# Patient Record
Sex: MALE | Race: WHITE | NOT HISPANIC OR LATINO | Employment: OTHER | RURAL
[De-identification: names, ages, dates, MRNs, and addresses within clinical notes are randomized per-mention and may not be internally consistent; named-entity substitution may affect disease eponyms.]

---

## 2017-06-02 ENCOUNTER — CONVERSION ENCOUNTER (OUTPATIENT)
Dept: FAMILY MEDICINE CLINIC | Facility: CLINIC | Age: 62
End: 2017-06-02

## 2017-06-02 LAB
ALBUMIN SERPL-MCNC: 4.5 G/DL (ref 3.6–5.1)
ALBUMIN/GLOB SERPL: ABNORMAL {RATIO} (ref 1–2.5)
ALP SERPL-CCNC: 103 UNITS/L (ref 40–115)
ALT SERPL-CCNC: 18 UNITS/L (ref 9–46)
AST SERPL-CCNC: 18 UNITS/L (ref 10–35)
BASOPHILS # BLD AUTO: ABNORMAL 10*3/MM3 (ref 0–200)
BASOPHILS NFR BLD AUTO: 0.8 %
BILIRUB SERPL-MCNC: 0.4 MG/DL (ref 0.2–1.2)
BUN SERPL-MCNC: 21 MG/DL (ref 7–25)
BUN/CREAT SERPL: ABNORMAL (ref 6–22)
CALCIUM SERPL-MCNC: 9.7 MG/DL (ref 8.6–10.3)
CHLORIDE SERPL-SCNC: 106 MMOL/L (ref 98–110)
CO2 CONTENT VENOUS: 28 MMOL/L (ref 20–31)
CONV NEUTROPHILS/100 LEUKOCYTES IN BODY FLUID BY MANUAL COUNT: 56.2 %
CONV TOTAL PROTEIN: 7.3 G/DL (ref 6.1–8.1)
CREAT UR-MCNC: 1.06 MG/DL (ref 0.7–1.25)
EOSINOPHIL # BLD AUTO: 6.5 %
EOSINOPHIL # BLD AUTO: ABNORMAL 10*3/MM3 (ref 15–500)
ERYTHROCYTE [DISTWIDTH] IN BLOOD BY AUTOMATED COUNT: 13.4 % (ref 11–15)
GLOBULIN UR ELPH-MCNC: ABNORMAL G/DL (ref 1.9–3.7)
GLUCOSE SERPL-MCNC: 102 MG/DL (ref 65–99)
HCT VFR BLD AUTO: 46 % (ref 38.5–50)
HGB BLD-MCNC: 15.5 G/DL (ref 13.2–17.1)
LYMPHOCYTES # BLD AUTO: ABNORMAL 10*3/MM3 (ref 850–3900)
LYMPHOCYTES NFR BLD AUTO: 26.5 %
MCH RBC QN AUTO: 28.9 PG (ref 27–33)
MCHC RBC AUTO-ENTMCNC: ABNORMAL % (ref 32–36)
MCV RBC AUTO: 85.7 FL (ref 80–100)
MONOCYTES # BLD AUTO: ABNORMAL 10*3/MICROLITER (ref 200–950)
MONOCYTES NFR BLD AUTO: 10 %
NEUTROPHILS # BLD AUTO: ABNORMAL 10*3/MM3 (ref 1500–7800)
PLATELET # BLD AUTO: ABNORMAL 10*3/MM3 (ref 140–400)
PMV BLD AUTO: 9.9 FL (ref 7.5–12.5)
POTASSIUM SERPL-SCNC: 4.5 MMOL/L (ref 3.5–5.3)
RBC # BLD AUTO: ABNORMAL 10*6/MM3 (ref 4.2–5.8)
SODIUM SERPL-SCNC: 139 MMOL/L (ref 135–146)
WBC # BLD AUTO: ABNORMAL K/UL (ref 3.8–10.8)

## 2018-01-04 ENCOUNTER — HOSPITAL ENCOUNTER (OUTPATIENT)
Dept: OTHER | Facility: HOSPITAL | Age: 63
Discharge: HOME | End: 2018-01-04
Attending: FAMILY MEDICINE | Admitting: FAMILY MEDICINE

## 2018-09-26 ENCOUNTER — HOSPITAL ENCOUNTER (OUTPATIENT)
Dept: OTHER | Facility: HOSPITAL | Age: 63
Discharge: HOME | End: 2018-09-26
Attending: FAMILY MEDICINE | Admitting: FAMILY MEDICINE

## 2023-01-13 ENCOUNTER — OFFICE VISIT (OUTPATIENT)
Dept: PAIN MEDICINE | Facility: CLINIC | Age: 68
End: 2023-01-13
Payer: MEDICARE

## 2023-01-13 VITALS
DIASTOLIC BLOOD PRESSURE: 84 MMHG | SYSTOLIC BLOOD PRESSURE: 154 MMHG | OXYGEN SATURATION: 97 % | HEART RATE: 62 BPM | RESPIRATION RATE: 16 BRPM

## 2023-01-13 DIAGNOSIS — M51.9 FORAMINAL STENOSIS DUE TO INTERVERTEBRAL DISC DISEASE: ICD-10-CM

## 2023-01-13 DIAGNOSIS — M99.79 FORAMINAL STENOSIS DUE TO INTERVERTEBRAL DISC DISEASE: ICD-10-CM

## 2023-01-13 DIAGNOSIS — M54.16 LUMBAR RADICULOPATHY: Primary | ICD-10-CM

## 2023-01-13 PROCEDURE — 99204 OFFICE O/P NEW MOD 45 MIN: CPT | Performed by: STUDENT IN AN ORGANIZED HEALTH CARE EDUCATION/TRAINING PROGRAM

## 2023-01-13 PROCEDURE — G0463 HOSPITAL OUTPT CLINIC VISIT: HCPCS | Performed by: STUDENT IN AN ORGANIZED HEALTH CARE EDUCATION/TRAINING PROGRAM

## 2023-01-13 RX ORDER — INDAPAMIDE 1.25 MG/1
1.25 TABLET, FILM COATED ORAL EVERY MORNING
COMMUNITY
Start: 2022-10-15

## 2023-01-13 RX ORDER — GABAPENTIN 300 MG/1
300 CAPSULE ORAL 3 TIMES DAILY
Qty: 90 CAPSULE | Refills: 0 | Status: SHIPPED | OUTPATIENT
Start: 2023-01-13 | End: 2023-02-09 | Stop reason: SDDI

## 2023-01-13 RX ORDER — OLMESARTAN MEDOXOMIL 40 MG/1
40 TABLET ORAL DAILY
COMMUNITY
Start: 2022-10-13

## 2023-01-13 RX ORDER — AMLODIPINE BESYLATE 5 MG/1
5 TABLET ORAL DAILY
COMMUNITY
Start: 2022-10-18

## 2023-01-13 RX ORDER — DICLOFENAC SODIUM 75 MG/1
TABLET, DELAYED RELEASE ORAL
COMMUNITY
Start: 2022-10-13

## 2023-01-13 NOTE — PROGRESS NOTES
CHIEF COMPLAINT  Chief Complaint   Patient presents with   • Hip Pain     New patient referred for right hip and right leg pain No Narcotics        Primary Care  Jarad Cancino MD    Subjective   Jeremy Gill is a 67 y.o. male  who presents for right lower extremity radiculopathy.  He states that his pain initially began several months ago.  He initially thought that he was having primary hip pain and was referred to the orthopedic surgeon.  His orthopedic surgery evaluation showed that while he did have very significant bilateral hip osteoarthritis, it also showed that he had fairly significant foraminal stenosis especially on the right.  His orthopedic surgeon felt that his pain was more likely a result of foraminal stenosis as opposed to actual hip pathology.  He describes pain in the low back which radiates into the right buttock and down the anterior lateral aspect of the right leg into the right foot.  He states that the pain is worse with movement and physical activity and improves with sitting and resting.  He denies any red flag symptoms such as loss of bowel or bladder.  He admits to some mild weakness, but no profound weakness or numbness.    History of Present Illness     Location: Low back with radiation the right lower extremity  Onset: 6 months ago  Duration: Progressively worsening  Timing: Constant throughout the day  Quality: Aching, sharp, stabbing  Severity: Today: 8       Last Week: 8       Worst: 9  Modifying Factors: The pain is worse with walking and movement and physical activity and improves with rest  Functional Deficit: The pain makes it difficult for him to work and perform his activities of daily living    Physical Therapy: no    Interval Update 01/13/2023:     The following portions of the patient's history were reviewed and updated as appropriate: allergies, current medications, past family history, past medical history, past social history, past surgical history and problem  list.      Current Outpatient Medications:   •  amLODIPine (NORVASC) 5 MG tablet, Take 5 mg by mouth Daily., Disp: , Rfl:   •  diclofenac (VOLTAREN) 75 MG EC tablet, TAKE 1 TABLET BY MOUTH TWICE A DAY AS NEEDED FOR PAIN WITH FOOD, Disp: , Rfl:   •  indapamide (LOZOL) 1.25 MG tablet, Take 1.25 mg by mouth Every Morning., Disp: , Rfl:   •  olmesartan (BENICAR) 40 MG tablet, Take 40 mg by mouth Daily., Disp: , Rfl:   •  gabapentin (NEURONTIN) 300 MG capsule, Take 1 capsule by mouth 3 (Three) Times a Day., Disp: 90 capsule, Rfl: 0    Review of Systems   Musculoskeletal: Positive for arthralgias, back pain and gait problem. Negative for joint swelling and myalgias.   Neurological: Positive for weakness. Negative for numbness.       Vitals:    01/13/23 1310   BP: 154/84   Pulse: 62   Resp: 16   SpO2: 97%   PainSc:   8         Objective   Physical Exam  Vitals and nursing note reviewed. Exam conducted with a chaperone present.   Constitutional:       General: He is not in acute distress.     Appearance: Normal appearance. He is well-developed and normal weight.   Neck:      Trachea: No tracheal deviation.   Musculoskeletal:      Comments: Lumbar Spine Exam:  Tender to palpation over the lumbar paraspinal musculature Yes  Limited range of motion secondary to pain Yes  Facet loading positive: Negative  Facets tender to palpation: Negative  Straight leg raise test positive: right       Neurological:      General: No focal deficit present.      Mental Status: He is alert.      Sensory: No sensory deficit.      Motor: Weakness present.           Assessment & Plan   Problems Addressed this Visit    None  Visit Diagnoses     Lumbar radiculopathy    -  Primary    Relevant Orders    Epidural Block    Foraminal stenosis due to intervertebral disc disease        Relevant Orders    Epidural Block      Diagnoses       Codes Comments    Lumbar radiculopathy    -  Primary ICD-10-CM: M54.16  ICD-9-CM: 724.4     Foraminal stenosis due to  intervertebral disc disease     ICD-10-CM: M99.79, M51.9  ICD-9-CM: 724.00, 722.90           Plan:  1. Given his foraminal stenosis seen on MRI, will plan for right-sided L2 and L3 TFESI consistent with his radicular symptoms  2. Also start gabapentin 300 mg 3 times daily  --- Follow-up next available for right-sided L2 and L3 TFESI           INSPECT REPORT    As part of the patient's treatment plan, I may be prescribing controlled substances. The patient has been made aware of appropriate use of such medications, including potential risk of somnolence, limited ability to drive and/or work safely, and the potential for dependence or overdose. It has also bee made clear that these medications are for use by this patient only, without concomitant use of alcohol or other substances unless prescribed.     Patient has completed prescribing agreement detailing terms of continued prescribing of controlled substances, including monitoring ALVIN reports, urine drug screening, and pill counts if necessary. The patient is aware that inappropriate use will results in cessation of prescribing such medications.    INSPECT report has been reviewed and scanned into the patient's chart.    As the clinician, I personally reviewed the INSPECT from 1/12/2023.    History and physical exam exhibit continued safe and appropriate use of controlled substances.      EMR Dragon/Transcription disclaimer:   Much of this encounter note is an electronic transcription/translation of spoken language to printed text. The electronic translation of spoken language may permit erroneous, or at times, nonsensical words or phrases to be inadvertently transcribed; Although I have reviewed the note for such errors, some may still exist.

## 2023-01-19 ENCOUNTER — HOSPITAL ENCOUNTER (OUTPATIENT)
Dept: GENERAL RADIOLOGY | Facility: HOSPITAL | Age: 68
Discharge: HOME | End: 2023-01-19
Payer: MEDICARE

## 2023-01-19 ENCOUNTER — HOSPITAL ENCOUNTER (OUTPATIENT)
Dept: PAIN MEDICINE | Facility: HOSPITAL | Age: 68
Discharge: HOME | End: 2023-01-19
Payer: MEDICARE

## 2023-01-19 VITALS
TEMPERATURE: 97.1 F | HEART RATE: 60 BPM | DIASTOLIC BLOOD PRESSURE: 90 MMHG | SYSTOLIC BLOOD PRESSURE: 150 MMHG | OXYGEN SATURATION: 97 % | RESPIRATION RATE: 16 BRPM

## 2023-01-19 DIAGNOSIS — M99.79 FORAMINAL STENOSIS DUE TO INTERVERTEBRAL DISC DISEASE: ICD-10-CM

## 2023-01-19 DIAGNOSIS — M54.16 LUMBAR RADICULOPATHY: Primary | ICD-10-CM

## 2023-01-19 DIAGNOSIS — M51.9 FORAMINAL STENOSIS DUE TO INTERVERTEBRAL DISC DISEASE: ICD-10-CM

## 2023-01-19 DIAGNOSIS — R52 PAIN: ICD-10-CM

## 2023-01-19 PROCEDURE — 64483 NJX AA&/STRD TFRM EPI L/S 1: CPT | Performed by: STUDENT IN AN ORGANIZED HEALTH CARE EDUCATION/TRAINING PROGRAM

## 2023-01-19 PROCEDURE — 25010000002 DEXAMETHASONE SODIUM PHOSPHATE 10 MG/ML SOLUTION: Performed by: STUDENT IN AN ORGANIZED HEALTH CARE EDUCATION/TRAINING PROGRAM

## 2023-01-19 PROCEDURE — 64484 NJX AA&/STRD TFRM EPI L/S EA: CPT | Performed by: STUDENT IN AN ORGANIZED HEALTH CARE EDUCATION/TRAINING PROGRAM

## 2023-01-19 PROCEDURE — 0 IOPAMIDOL 41 % SOLUTION: Performed by: STUDENT IN AN ORGANIZED HEALTH CARE EDUCATION/TRAINING PROGRAM

## 2023-01-19 PROCEDURE — 77003 FLUOROGUIDE FOR SPINE INJECT: CPT

## 2023-01-19 RX ORDER — DEXAMETHASONE SODIUM PHOSPHATE 10 MG/ML
10 INJECTION, SOLUTION INTRAMUSCULAR; INTRAVENOUS ONCE
Status: COMPLETED | OUTPATIENT
Start: 2023-01-19 | End: 2023-01-19

## 2023-01-19 RX ORDER — BUPIVACAINE HYDROCHLORIDE 2.5 MG/ML
10 INJECTION, SOLUTION EPIDURAL; INFILTRATION; INTRACAUDAL ONCE
Status: COMPLETED | OUTPATIENT
Start: 2023-01-19 | End: 2023-01-19

## 2023-01-19 RX ADMIN — BUPIVACAINE HYDROCHLORIDE 10 ML: 2.5 INJECTION, SOLUTION EPIDURAL; INFILTRATION; INTRACAUDAL; PERINEURAL at 13:47

## 2023-01-19 RX ADMIN — DEXAMETHASONE SODIUM PHOSPHATE 10 MG: 10 INJECTION, SOLUTION INTRAMUSCULAR; INTRAVENOUS at 13:48

## 2023-01-19 RX ADMIN — IOPAMIDOL 3 ML: 408 INJECTION, SOLUTION INTRATHECAL at 13:48

## 2023-01-19 NOTE — PROCEDURES
Lumbar Transforaminal Epidural Steroid Injection (two levels)  [facility]      PREOPERATIVE DIAGNOSIS:  Lumbar Degenerative Disc Disease, Lumbar Disc Displacement and Lumbar Radiculopathy    POSTOPERATIVE DIAGNOSIS:  Same as preop diagnosis    PROCEDURE:    1. CPT 37091 --  Diagnostic & Therapeutic Transforaminal Epidural Steroid Injection at the L2 level, on the right   2. CPT 14305 --  Diagnostic & Therapeutic Transforaminal Epidural Steroid Injection at the L3 level, on the right.    PRE-PROCEDURE DISCUSSION WITH PATIENT:    Risks and complications were discussed with the patient prior to starting the procedure and informed consent was obtained.  We discussed various topics including but not limited to bleeding, infection, injury, nerve injury, paralysis, coma, death, postprocedural painful flare-up, postprocedural site soreness, and a lack of pain relief.  We discussed the diagnostic aspect of transforaminal epidural / selective nerve root blockade.    SURGEON:  Shaun Abdul MD    REASON FOR PROCEDURE:    Diagnostic injection at this level is needed, Degenerative changes are noted in the area. and Radiating pattern of pain is likely consistent with degenerative changes in the area.    SEDATION:  Patient declined administration of moderate sedation    ANESTHETIC:  Marcaine 0.25%  STEROID:  Dexamethasone 10mg    DESCRIPTON OF PROCEDURE:  After obtaining informed consent, an I.V. was not started in the preoperative area. The patient taken to the operating room and was placed in the prone position with a pillow under the abdomen.  All pressure points were well padded.  The lumbar area was prepped with Chloraprep and draped in a sterile fashion. Under fluoroscopic guidance in an oblique dimension on the above mentioned side, the transverse process of the first aforementioned vertebra at the junction of the body at 6 o'clock position was identified. Skin and subcutaneous tissue was anesthetized with 1%  lidocaine. A 22-gauge spinal needle was introduced under fluoroscopic guidance at the above junction into the foramen without parasthesias and into the epidural space. After confirming the position of the needle with PA, lateral, and oblique fluoroscopic views, aspiration was checked and was clear of blood or CSF.  Next, 1 mL of Omnipaque was injected. After seeing adequate spread on the corresponding nerve root, a total volume 2mL of injectate containing local anesthetic as above and half of the above mentioned corticosteroid was injected into the epidural space.  The needle was removed intact.      Next, in similar fashion, the second level mentioned above was addressed and a similar amount of injectate was delivered after similar imaging was achieved.      Vital signs were stable throughout.          ESTIMATED BLOOD LOSS:  <5 mL  SPECIMENS:  none    COMPLICATIONS:   No complications were noted., There was no indication of vascular uptake on live injection of contrast dye., There was no indication of intrathecal uptake on live injection of contrast dye. and There was not any evidence of dural puncture.      TOLERANCE & DISCHARGE CONDITION:    The patient tolerated the procedure well.  The patient was transported to the recovery area without difficulties.  The patient was discharged to home under the care of family in stable and satisfactory condition.    PLAN OF CARE:  1. The patient was given our standard instruction sheet.  2. The patient will Return to clinic 6 wks.  3. The patient will resume all medications as per the medication reconciliation sheet.

## 2023-01-24 ENCOUNTER — TELEPHONE (OUTPATIENT)
Dept: PAIN MEDICINE | Facility: CLINIC | Age: 68
End: 2023-01-24
Payer: MEDICARE

## 2023-01-24 NOTE — TELEPHONE ENCOUNTER
Caller: TORI SIMS    Relationship: WIFE    Best call back number:     Who are you requesting to speak with (clinical staff, provider,  specific staff member): DR MCKEON    What was the call regarding: MS SIMS STATED THE EPIDURAL THE PATIENT HAD ON 1/19/23 IS NOT HELPING HIS PAIN ANYMORE. THE PATIENT STATED THE EPIDURAL HELPED HIS PAIN FOR 2 DAYS AT THE MOST.     Do you require a callback: YES

## 2023-01-27 ENCOUNTER — OFFICE VISIT (OUTPATIENT)
Dept: PAIN MEDICINE | Facility: CLINIC | Age: 68
End: 2023-01-27
Payer: MEDICARE

## 2023-01-27 VITALS
DIASTOLIC BLOOD PRESSURE: 84 MMHG | OXYGEN SATURATION: 97 % | HEART RATE: 74 BPM | SYSTOLIC BLOOD PRESSURE: 145 MMHG | RESPIRATION RATE: 16 BRPM

## 2023-01-27 DIAGNOSIS — M51.9 FORAMINAL STENOSIS DUE TO INTERVERTEBRAL DISC DISEASE: ICD-10-CM

## 2023-01-27 DIAGNOSIS — Z79.899 HIGH RISK MEDICATION USE: Primary | ICD-10-CM

## 2023-01-27 DIAGNOSIS — M99.79 FORAMINAL STENOSIS DUE TO INTERVERTEBRAL DISC DISEASE: ICD-10-CM

## 2023-01-27 PROCEDURE — 99214 OFFICE O/P EST MOD 30 MIN: CPT | Performed by: STUDENT IN AN ORGANIZED HEALTH CARE EDUCATION/TRAINING PROGRAM

## 2023-01-27 PROCEDURE — G0463 HOSPITAL OUTPT CLINIC VISIT: HCPCS | Performed by: STUDENT IN AN ORGANIZED HEALTH CARE EDUCATION/TRAINING PROGRAM

## 2023-01-27 RX ORDER — HYDROCODONE BITARTRATE AND ACETAMINOPHEN 10; 325 MG/1; MG/1
1 TABLET ORAL EVERY 8 HOURS PRN
Qty: 90 TABLET | Refills: 0 | Status: SHIPPED | OUTPATIENT
Start: 2023-01-27

## 2023-01-27 NOTE — PROGRESS NOTES
CHIEF COMPLAINT  Chief Complaint   Patient presents with   • Back Pain     F/u from Lumbar epidural block  CC  right lower extremity radiculopathy No Narcotics        Primary Care  Jarad Cancino MD    Subjective   Jeremy Gill is a 67 y.o. male  who presents for right lower extremity radiculopathy.  He states that his pain initially began several months ago.  He initially thought that he was having primary hip pain and was referred to the orthopedic surgeon.  His orthopedic surgery evaluation showed that while he did have very significant bilateral hip osteoarthritis, it also showed that he had fairly significant foraminal stenosis especially on the right.  His orthopedic surgeon felt that his pain was more likely a result of foraminal stenosis as opposed to actual hip pathology.  He describes pain in the low back which radiates into the right buttock and down the anterior lateral aspect of the right leg into the right foot.  He states that the pain is worse with movement and physical activity and improves with sitting and resting.  He denies any red flag symptoms such as loss of bowel or bladder.  He admits to some mild weakness, but no profound weakness or numbness.    Back Pain  Associated symptoms include weakness. Pertinent negatives include no numbness.        Location: Low back with radiation the right lower extremity  Onset: 6 months ago  Duration: Progressively worsening  Timing: Constant throughout the day  Quality: Aching, sharp, stabbing  Severity: Today: 8       Last Week: 8       Worst: 9  Modifying Factors: The pain is worse with walking and movement and physical activity and improves with rest  Functional Deficit: The pain makes it difficult for him to work and perform his activities of daily living    Physical Therapy: no    Interval Update 01/27/2023: He reports approximately 48 hours of 100% pain relief following his transforaminal with a return of symptoms as before.  Limited benefit  with gabapentin.    The following portions of the patient's history were reviewed and updated as appropriate: allergies, current medications, past family history, past medical history, past social history, past surgical history and problem list.      Current Outpatient Medications:   •  amLODIPine (NORVASC) 5 MG tablet, Take 5 mg by mouth Daily., Disp: , Rfl:   •  diclofenac (VOLTAREN) 75 MG EC tablet, TAKE 1 TABLET BY MOUTH TWICE A DAY AS NEEDED FOR PAIN WITH FOOD, Disp: , Rfl:   •  gabapentin (NEURONTIN) 300 MG capsule, Take 1 capsule by mouth 3 (Three) Times a Day., Disp: 90 capsule, Rfl: 0  •  indapamide (LOZOL) 1.25 MG tablet, Take 1.25 mg by mouth Every Morning., Disp: , Rfl:   •  olmesartan (BENICAR) 40 MG tablet, Take 40 mg by mouth Daily., Disp: , Rfl:   •  HYDROcodone-acetaminophen (NORCO)  MG per tablet, Take 1 tablet by mouth Every 8 (Eight) Hours As Needed for Moderate Pain., Disp: 90 tablet, Rfl: 0    Review of Systems   Musculoskeletal: Positive for arthralgias, back pain and gait problem. Negative for joint swelling and myalgias.   Neurological: Positive for weakness. Negative for numbness.       Vitals:    01/27/23 1357   BP: 145/84   Pulse: 74   Resp: 16   SpO2: 97%   PainSc:   8         Objective   Physical Exam  Vitals and nursing note reviewed. Exam conducted with a chaperone present.   Constitutional:       General: He is not in acute distress.     Appearance: Normal appearance. He is well-developed and normal weight.   Neck:      Trachea: No tracheal deviation.   Musculoskeletal:      Comments: Lumbar Spine Exam:  Tender to palpation over the lumbar paraspinal musculature Yes  Limited range of motion secondary to pain Yes  Facet loading positive: Negative  Facets tender to palpation: Negative  Straight leg raise test positive: right       Neurological:      General: No focal deficit present.      Mental Status: He is alert.      Sensory: No sensory deficit.      Motor: Weakness present.            Assessment & Plan   Problems Addressed this Visit    None  Visit Diagnoses     High risk medication use    -  Primary    Relevant Orders    Urine Drug Screen - Urine, Clean Catch    Foraminal stenosis due to intervertebral disc disease        Relevant Medications    HYDROcodone-acetaminophen (NORCO)  MG per tablet    Other Relevant Orders    Ambulatory Referral to Neurosurgery      Diagnoses       Codes Comments    High risk medication use    -  Primary ICD-10-CM: Z79.899  ICD-9-CM: V58.69     Foraminal stenosis due to intervertebral disc disease     ICD-10-CM: M99.79, M51.9  ICD-9-CM: 724.00, 722.90           Plan:  1. Refer to neurosurgery for consideration of decompression as he did get positive diagnostic benefit from transforaminal epidural steroid injections  2. We will start hydrocodone 10 mg 3 times daily has he does not get benefit from gabapentin  3. UDS and contract today.  Inspect appropriate  --- Follow-up 1 month           INSPECT REPORT    As part of the patient's treatment plan, I may be prescribing controlled substances. The patient has been made aware of appropriate use of such medications, including potential risk of somnolence, limited ability to drive and/or work safely, and the potential for dependence or overdose. It has also bee made clear that these medications are for use by this patient only, without concomitant use of alcohol or other substances unless prescribed.     Patient has completed prescribing agreement detailing terms of continued prescribing of controlled substances, including monitoring ALVIN reports, urine drug screening, and pill counts if necessary. The patient is aware that inappropriate use will results in cessation of prescribing such medications.    INSPECT report has been reviewed and scanned into the patient's chart.    As the clinician, I personally reviewed the INSPECT from 1/25/2023.    History and physical exam exhibit continued safe and  appropriate use of controlled substances.      EMR Dragon/Transcription disclaimer:   Much of this encounter note is an electronic transcription/translation of spoken language to printed text. The electronic translation of spoken language may permit erroneous, or at times, nonsensical words or phrases to be inadvertently transcribed; Although I have reviewed the note for such errors, some may still exist.

## 2023-02-07 NOTE — PROGRESS NOTES
Subjective   History of Present Illness: Jeremy Gill is a 67 y.o. male is being seen for consultation today at the request of Janes Abdul MD for right-sided back, hip and right lower extremity pain.  Patient has a 7-month history of right-sided low back, hip, groin and anterior lateral leg pain.  Patient underwent a diagnostic injection with pain management on 1/19/2023 with partial positive diagnostic response.  His leg pain continues to be resolved but his right hip and groin are still very bothersome for him.  Most of his hip pain is lateral.  He has known degenerative issues in his hip and has been seen and evaluated with orthopedics who recommended a total hip replacement.  He has undergone no formal course of physical therapy but did try some chiropractic therapy in December with no benefit.  He reports no numbness and tingling, bowel/bladder dysfunction or saddle anesthesia.      Back Pain  This is a chronic problem. The current episode started more than 1 month ago. The problem occurs constantly. The problem is unchanged. The pain is present in the gluteal (right hip, Right groin). The quality of the pain is described as aching. The pain radiates to the right thigh. The pain is worse during the day. The symptoms are aggravated by bending, sitting, standing and twisting. Associated symptoms include leg pain, pelvic pain and weakness. He has tried ice and heat (injection) for the symptoms. The treatment provided no relief.       The following portions of the patient's history were reviewed and updated as appropriate: allergies, current medications, past family history, past medical history, past social history, past surgical history and problem list.    Review of Systems   Constitutional: Positive for activity change.   HENT: Negative.    Eyes: Negative.    Respiratory: Negative.    Cardiovascular: Negative.    Gastrointestinal: Negative.    Endocrine: Negative.    Genitourinary: Positive for pelvic  "pain.   Musculoskeletal:        Right buttock and hip   Skin: Negative.    Allergic/Immunologic: Negative.    Neurological: Positive for weakness.   Hematological: Negative.    Psychiatric/Behavioral: Positive for sleep disturbance.   All other systems reviewed and are negative.      Objective     ./89   Pulse 64   Ht 185.4 cm (73\")   Wt 106 kg (234 lb)   SpO2 95%   BMI 30.87 kg/m²    Body mass index is 30.87 kg/m².      Physical Exam  Neurologic Exam     Motor Exam     Strength   Strength 5/5 except as noted.   Right iliopsoas: 4/5    Positive right Ricki  Tenderness to palpation along greater trochanter  Antalgic gait        Assessment & Plan   Independent Review of Radiographic Studies:      I personally reviewed the images from the following studies.    MRI lumbar spine 1/19/2023    Superimposed disc protrusion along the right foraminal/lateral region along L3-L4 resulting in severe foraminal narrowing and compression of the right L3 nerve root.    Hip x-rays 1/26/2022    Severe osteoarthritic changes along right hip    Medical Decision Making:      Jeremy Gillis a 67 y.o. male that is presenting to clinic today as a new patient for right hip pain.Patient has a 7-month history of right-sided low back, hip, groin and anterior lateral leg pain.  Patient underwent a diagnostic injection with pain management on 1/19/2023 with partial positive diagnostic response.  His leg pain continues to be resolved but his right hip and groin are still very bothersome for him.   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  While he does have severe foraminal narrowing at L3-L4 on imaging, his radicular leg symptoms seem to remain well controlled after his diagnostic injection.  He does continue with significant hip and groin pain that may be more related to his known hip pathology.  I do recommend the patient return back to pain management for further diagnostic injections of his " right hip.  If patient does receive a positive response to these hip injections, I do recommend that he follow back up with orthopedics for possible hip replacement and continue with nonoperative treatment for his radicular leg pain.  If patient does not receive a positive response to the injection in the hip, the patient should follow-up in the office with Dr. Gary.  I do also believe the patient will benefit from a course of formal physical therapy as well.  He has agreed to recommendations and wishes to proceed.          Diagnoses and all orders for this visit:    1. Lumbar radiculopathy (Primary)  -     Ambulatory Referral to Physical Therapy Evaluate and treat    2. Hip pain  -     Ambulatory Referral to Physical Therapy Evaluate and treat      Return if symptoms worsen or fail to improve.    This patient was examined wearing appropriate personal protective equipment.     Jeremy ROXANNE Gill  reports that he has quit smoking. He has never used smokeless tobacco.     Body mass index is 30.87 kg/m².    BMI cannot be calculated due to outdated height or weight values.  Please input a current height/weight in Vitals and re-renter [id number] in Note to pull in correct documentation based on BMI range.    Patient's blood pressure was reviewed.  Recommendations for  a low-salt diet and exercise to maintain/improve BP in addition to taking any presribed medications.    Advance Care Planning   ACP discussion was held with the patient during this visit. Patient has an advance directive (not in EMR), copy requested.         Lisa Neri, KIMBER  02/09/23  14:14 EST

## 2023-02-09 ENCOUNTER — OFFICE VISIT (OUTPATIENT)
Dept: NEUROSURGERY | Facility: CLINIC | Age: 68
End: 2023-02-09
Payer: MEDICARE

## 2023-02-09 VITALS
HEIGHT: 73 IN | SYSTOLIC BLOOD PRESSURE: 149 MMHG | WEIGHT: 234 LBS | OXYGEN SATURATION: 95 % | BODY MASS INDEX: 31.01 KG/M2 | HEART RATE: 64 BPM | DIASTOLIC BLOOD PRESSURE: 89 MMHG

## 2023-02-09 DIAGNOSIS — M25.559 HIP PAIN: ICD-10-CM

## 2023-02-09 DIAGNOSIS — M54.16 LUMBAR RADICULOPATHY: Primary | ICD-10-CM

## 2023-02-09 PROCEDURE — 99204 OFFICE O/P NEW MOD 45 MIN: CPT | Performed by: NURSE PRACTITIONER

## 2023-02-10 ENCOUNTER — TELEPHONE (OUTPATIENT)
Dept: NEUROSURGERY | Facility: CLINIC | Age: 68
End: 2023-02-10

## 2023-02-10 DIAGNOSIS — M25.559 HIP PAIN: Primary | ICD-10-CM

## 2023-02-10 NOTE — TELEPHONE ENCOUNTER
Caller: TORI SIMS    Relationship to patient: SPOUSE    Best call back number:[phone]  Patient is needing: PATIENT'S WIFE IS CALLING TO INQUIRE ABOUT INJECTION REFERRAL, SHE STATES DR. STALLINGS IS UNABLE TO SCHEDULE W/O AN ORDER. PLEASE CALL THE PATIENT WHEN ABLE TO SCHEDULE.

## 2023-02-16 ENCOUNTER — HOSPITAL ENCOUNTER (OUTPATIENT)
Dept: PAIN MEDICINE | Facility: HOSPITAL | Age: 68
Discharge: HOME | End: 2023-02-16
Payer: MEDICARE

## 2023-02-16 ENCOUNTER — HOSPITAL ENCOUNTER (OUTPATIENT)
Dept: GENERAL RADIOLOGY | Facility: HOSPITAL | Age: 68
Discharge: HOME | End: 2023-02-16
Payer: MEDICARE

## 2023-02-16 VITALS
RESPIRATION RATE: 16 BRPM | HEART RATE: 66 BPM | DIASTOLIC BLOOD PRESSURE: 80 MMHG | SYSTOLIC BLOOD PRESSURE: 124 MMHG | OXYGEN SATURATION: 97 % | TEMPERATURE: 97.4 F

## 2023-02-16 DIAGNOSIS — M25.559 HIP PAIN: ICD-10-CM

## 2023-02-16 DIAGNOSIS — R52 PAIN: ICD-10-CM

## 2023-02-16 PROCEDURE — 0 IOPAMIDOL 41 % SOLUTION: Performed by: STUDENT IN AN ORGANIZED HEALTH CARE EDUCATION/TRAINING PROGRAM

## 2023-02-16 PROCEDURE — 77002 NEEDLE LOCALIZATION BY XRAY: CPT

## 2023-02-16 PROCEDURE — 77002 NEEDLE LOCALIZATION BY XRAY: CPT | Performed by: STUDENT IN AN ORGANIZED HEALTH CARE EDUCATION/TRAINING PROGRAM

## 2023-02-16 PROCEDURE — 20610 DRAIN/INJ JOINT/BURSA W/O US: CPT | Performed by: STUDENT IN AN ORGANIZED HEALTH CARE EDUCATION/TRAINING PROGRAM

## 2023-02-16 PROCEDURE — 25010000002 METHYLPREDNISOLONE PER 40 MG: Performed by: STUDENT IN AN ORGANIZED HEALTH CARE EDUCATION/TRAINING PROGRAM

## 2023-02-16 RX ORDER — METHYLPREDNISOLONE ACETATE 40 MG/ML
40 INJECTION, SUSPENSION INTRA-ARTICULAR; INTRALESIONAL; INTRAMUSCULAR; SOFT TISSUE ONCE
Status: COMPLETED | OUTPATIENT
Start: 2023-02-16 | End: 2023-02-16

## 2023-02-16 RX ORDER — BUPIVACAINE HYDROCHLORIDE 2.5 MG/ML
10 INJECTION, SOLUTION EPIDURAL; INFILTRATION; INTRACAUDAL ONCE
Status: COMPLETED | OUTPATIENT
Start: 2023-02-16 | End: 2023-02-16

## 2023-02-16 RX ADMIN — BUPIVACAINE HYDROCHLORIDE 10 ML: 2.5 INJECTION, SOLUTION EPIDURAL; INFILTRATION; INTRACAUDAL; PERINEURAL at 14:03

## 2023-02-16 RX ADMIN — IOPAMIDOL 3 ML: 408 INJECTION, SOLUTION INTRATHECAL at 14:03

## 2023-02-16 RX ADMIN — METHYLPREDNISOLONE ACETATE 40 MG: 40 INJECTION, SUSPENSION INTRA-ARTICULAR; INTRALESIONAL; INTRAMUSCULAR; SOFT TISSUE at 14:04

## 2023-02-16 NOTE — PROCEDURES
right intraarticular hip injection - anterior approach  [facility]    PREOPERATIVE DIAGNOSIS:     Right hip pain Right hip osteoarthritis  POSTOPERATIVE DIAGNOSIS:   Same as pre-op    PROCEDURE:  20610 - right Intra-articular Hip Joint Injection, with fluoroscopic guidance & hip arthrogram - Anterior Approach    PRE-PROCEDURE DISCUSSION WITH PATIENT:    Risks and complications were discussed with the patient prior to starting the procedure (including but not limited to infection, bleeding, injury, paralysis, and death) and informed consent was obtained.      SURGEON:  Janes Abdul MD    REASON FOR PROCEDURE:   Right hip pain Right hip osteoarthritis    SEDATION:  Patient declined administration of moderate sedation    ANESTHETIC:  Marcaine 0.25%  STEROID:   Methylprednisolone (DEPO MEDROL) 40mg/ml    DESCRIPTON OF PROCEDURE:  After obtaining informed consent, IV access was obtained in the preoperative area.  The patient was transported to the operative suite and placed in supine position.  The hip area was prepped in a wide diameter with Chloraprep and draped in a sterile fashion.     A point on the lateral aspect of the neck of the femur was identified and was anesthetized with 1% Lidocaine.  A  25-gauge spinal needle was inserted under fluoroscopic guidance and strict aseptic technique, directed medially toward the head of the femur until contact with periosteum. Aspiration was confirmed negative.  After confirming the position of the needle with fluoroscopy, 1-3 mL of Omnipaque was injected and after seeing appropriate spread into the joint a total of 5mL of injectate including the above listed local anesthetic and steroid was injected into the joint.  Vital signs remained stable.  The onset of analgesia was noted.    ESTIMATED BLOOD LOSS:  minimal  SPECIMENS:  None    COMPLICATIONS:   No complications were noted. and There was no indication of vascular uptake on live injection of contrast  dye.    TOLERANCE & DISCHARGE CONDITION:    The patient tolerated the procedure well.  The patient was transported to the recovery area without difficulties.  The patient was discharged to home under the care of a chaperone and in satisfactory condition.    PLAN OF CARE:  1. The patient was given our standard instruction sheet.  2. The patient will Return to clinic 6 wks  3. The patient will resume all medications as per the medication reconciliation sheet.

## 2023-03-07 ENCOUNTER — TREATMENT (OUTPATIENT)
Dept: PHYSICAL THERAPY | Facility: CLINIC | Age: 68
End: 2023-03-07
Payer: MEDICARE

## 2023-03-07 DIAGNOSIS — M54.16 RADICULOPATHY, LUMBAR REGION: ICD-10-CM

## 2023-03-07 DIAGNOSIS — M25.551 RIGHT HIP PAIN: Primary | ICD-10-CM

## 2023-03-07 DIAGNOSIS — R26.9 GAIT DISTURBANCE: ICD-10-CM

## 2023-03-07 DIAGNOSIS — R29.898 WEAKNESS OF RIGHT LEG: ICD-10-CM

## 2023-03-07 PROCEDURE — 97162 PT EVAL MOD COMPLEX 30 MIN: CPT | Performed by: PHYSICAL THERAPIST

## 2023-03-07 NOTE — PROGRESS NOTES
Physical Therapy Initial Evaluation and Plan of Care     [address] [place] [address] [place] [address]    Patient: Jeremy Gill   : 1955  Diagnosis/ICD-10 Code:  Right hip pain [M25.551]  Referring practitioner: KIMBER Abdalla  Date of Initial Visit: 3/7/2023  Today's Date: 3/7/2023  Patient seen for 1 sessions             Subjective Questionnaire: PT Functional Test: Oxford Hip = 16/48, indicating 33; Oswestry = 58% impairment    Subjective Evaluation    History of Present Illness  Mechanism of injury: Pt reports having R hip and anterior thigh pain x6 mo. Has occasional R groin pain. States pain generally starts in R buttock and then wraps around hip and down thigh. Pt is current with pain management for back pain. Had injections that helped for about 2 days. Pt reports R hip pain is constant. Difficulty getting in/out of car. Difficulty lifting R leg in/out of bed. Pt works as a  and is self employed. States he works ~4 hr/day. Has difficulty climbing ladder and leads with L leg each time. 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 Taking aleve 1x/day at night and hydrocodone in the morning. No relief with ice or heat.      Patient Occupation:  Quality of life: good    Pain  Current pain ratin  At best pain ratin  At worst pain ratin  Location: R hip  Quality: dull ache  Relieving factors: rest and medications  Aggravating factors: movement, standing, stairs, ambulation, prolonged positioning, repetitive movement and squatting  Progression: worsening    Social Support  Lives in: one-story house  Lives with: spouse    Diagnostic Tests  X-ray: abnormal (see chart)    Patient Goals  Patient goals for therapy: decreased pain, improved balance, increased motion and increased strength             Objective          Postural Observations  Standing posture: fair    Additional Postural Observation Details  Increased wt bearing on L LE in standing with R hip positioned in mild  ext rotation. Mild L trunk lean to off load R LE.    Gait: decreased toshia with antalgic gait, no assistive device, R hip in ext rotation, decreased stance time on R, decreased R step length.    Palpation     Right Tenderness of the iliopsoas and piriformis.     Tenderness     Right Hip   Tenderness in the greater trochanter.     Active Range of Motion   Left Hip   Normal active range of motion  Flexion: 120 degrees   External rotation (90/90): 60 degrees     Right Hip   Flexion: 95 degrees   External rotation (90/90): 35 degrees   Left Knee   Normal active range of motion    Right Knee   Normal active range of motion    Additional Active Range of Motion Details  Lumbar AROM limited in all directions with c/o R buttock and hip pain.   R hip ext ROM lacking 10 deg.    Strength/Myotome Testing     Left Hip   Normal muscle strength    Right Hip   Planes of Motion   Flexion: 3-  Abduction: 4    Left Knee   Normal strength    Right Knee   Normal strength    Tests     Lumbar     Right   Negative passive SLR.     Right Hip   Positive IVETT.   Vasyl: Positive.     Right Hip Flexibility Comments:   Significant tightness of R hip flexors, piriformis, and hamstring.          Assessment & Plan     Assessment  Impairments: abnormal gait, abnormal or restricted ROM, activity intolerance, impaired balance, impaired physical strength, lacks appropriate home exercise program, pain with function and weight-bearing intolerance  Functional Limitations: lifting, sleeping, walking, uncomfortable because of pain, sitting, standing, stooping and unable to perform repetitive tasks  Assessment details: Pt is 66 yo male with progressive worsening of R hip pain. Pt also with c/o R low back pain that starts in his buttock and wraps around front of leg. Pt presents with decreased ROM of R hip and decreased R hip strength. Pt with antalgic gait that is likely contributing to continued R-sided low back pain. Imaging of R hip show severe  osteoarthritic changes. Pt is having difficulty with all ambulation. Difficulty getting in/out of car. Difficulty completing work tasks like climbing ladders. Recommended pt f/u with ortho again regarding severe hip OA. Oxford Hip score indicates 67% impairment.    Patient presents with the impairments listed above and based on the objective findings and the physical therapy evaluation, the patient's condition has the potential to improve in response to therapy.   The patient's condition and/or services required are at a level of complexity that necessitates the skill & supervision of a physical therapist.    Prognosis: good    Goals  Plan Goals: STG to be met in 3 wk:  - Pt to perform SLR x5 reps without difficulty.  - Pt to report 25% ease with getting in/out of car.  - Pt to be independent with HEP.  LTG to be met in 12 wk:  - Improve Oxford hip score to 30% or less impairment.  - Increase R hip flex strength to 4-/5 or better in order to lift R leg in/out of bed.  - Pt to stand with equal wt bearing B LE for improved sit to stand transfers getting out of car.  - Increase R hip ext AROM to 0 deg in order to climb ladder during work.  - R LE radicular symptoms to be decreased by 50% for decreased pain and improved tolerance to walk on roofs.    Plan  Therapy options: will be seen for skilled therapy services  Planned modality interventions: thermotherapy (hydrocollator packs), electrical stimulation/Russian stimulation and traction  Planned therapy interventions: abdominal trunk stabilization, balance/weight-bearing training, body mechanics training, flexibility, functional ROM exercises, gait training, home exercise program, joint mobilization, manual therapy, neuromuscular re-education, soft tissue mobilization, strengthening, stretching, therapeutic activities and transfer training  Frequency: 2x week  Duration in weeks: 12  Treatment plan discussed with: patient        Timed:         Manual Therapy:          mins  19246;     Therapeutic Exercise:    5     mins  56099;     Neuromuscular Kenneth:        mins  68649;    Therapeutic Activity:          mins  53374;     Gait Training:           mins  58690;     Ultrasound:          mins  76191;    Ionto                                   mins   08927  Self - Care                          mins  91791    Un-Timed:  Electrical Stimulation:         mins  37984 ( );  Dry Needling          85388/55604  Traction          mins 59983  Can Repos          mins 48725  Low Eval          Mins  06939  Mod Eval     40     Mins  96645  High Eval                            Mins  10688      Timed Treatment:   5   mins   Total Treatment:     45   mins      PT SIGNATURE: Pretty Rodriguez PT, CLT  [place] Lic # [id number]  Electronically signed by Pretty Rodriguez PT, 03/07/23, 1:41 PM EST    Initial Certification  Certification Period: 3/7/2023 thru 6/4/2023  I certify that the therapy services are furnished while this patient is under my care.  The services outlined above are required by this patient, and will be reviewed every 90 days.     PHYSICIAN: Lisa Neri APRN _____________________________________________________  NPI: [id number]                                      DATE:    Please sign and return via fax to [phone]. Thank you, [facility].

## 2023-03-09 ENCOUNTER — TREATMENT (OUTPATIENT)
Dept: PHYSICAL THERAPY | Facility: CLINIC | Age: 68
End: 2023-03-09
Payer: MEDICARE

## 2023-03-09 DIAGNOSIS — M25.551 RIGHT HIP PAIN: Primary | ICD-10-CM

## 2023-03-09 DIAGNOSIS — R29.898 WEAKNESS OF RIGHT LEG: ICD-10-CM

## 2023-03-09 DIAGNOSIS — M54.16 RADICULOPATHY, LUMBAR REGION: ICD-10-CM

## 2023-03-09 DIAGNOSIS — R26.9 GAIT DISTURBANCE: ICD-10-CM

## 2023-03-09 PROCEDURE — 97140 MANUAL THERAPY 1/> REGIONS: CPT | Performed by: PHYSICAL THERAPIST

## 2023-03-09 PROCEDURE — 97530 THERAPEUTIC ACTIVITIES: CPT | Performed by: PHYSICAL THERAPIST

## 2023-03-09 PROCEDURE — 97110 THERAPEUTIC EXERCISES: CPT | Performed by: PHYSICAL THERAPIST

## 2023-03-14 NOTE — PROGRESS NOTES
Physical Therapy Daily Treatment Note      Patient: Jeremy Gill   : 1955  Diagnosis/ICD-10 Code:  Right hip pain [M25.551]   Problems Addressed this Visit    None  Visit Diagnoses     Right hip pain    -  Primary    Radiculopathy, lumbar region        Weakness of right leg        Gait disturbance          Diagnoses       Codes Comments    Right hip pain    -  Primary ICD-10-CM: M25.551  ICD-9-CM: 719.45     Radiculopathy, lumbar region     ICD-10-CM: M54.16  ICD-9-CM: 724.4     Weakness of right leg     ICD-10-CM: R29.898  ICD-9-CM: 729.89     Gait disturbance     ICD-10-CM: R26.9  ICD-9-CM: 781.2         Referring practitioner: KIMBER Abdalla  Date of Initial Visit: Type: THERAPY  Noted: 3/7/2023  Today's Date: 3/9/2023    VISIT#: 2    Subjective : Pt reports that his hip is feeling a little better. Still having some low back pain. Doing HEP.    Objective : Added nustep, leg press, and hip strengthening ther ex this date.  Cues to promote R toe off and R hip flex to advance leg and decrease compensation of circumduction. Pt able to make good corrections with cues.  See Exercise, Manual, and Modality Logs for complete treatment.     Assessment/Plan : Mild decrease in pain at start of session. Good tolerance to ther ex progression. Continued tenderness to palpation R piriformis. Pt needs continued R hip stretching and strengthening to decrease pain and improve ambulation.     Goals  Plan Goals: STG to be met in 3 wk:  - Pt to perform SLR x5 reps without difficulty.  - Pt to report 25% ease with getting in/out of car.  - Pt to be independent with HEP.  LTG to be met in 12 wk:  - Improve Oxford hip score to 30% or less impairment.  - Increase R hip flex strength to 4-/5 or better in order to lift R leg in/out of bed.  - Pt to stand with equal wt bearing B LE for improved sit to stand transfers getting out of car.  - Increase R hip ext AROM to 0 deg in order to climb ladder during work.  - R LE radicular  symptoms to be decreased by 50% for decreased pain and improved tolerance to walk on roofs.    Progress per Plan of Care and Progress strengthening /stabilization /functional activity         Timed:         Manual Therapy:    12     mins  08024;     Therapeutic Exercise:    20     mins  99141;     Neuromuscular Kenneth:        mins  34962;    Therapeutic Activity:     8     mins  81759;     Gait Training:           mins  22077;     Ultrasound:          mins  60085;    Ionto                                   mins   39405  Self Care                            mins   01667    Un-Timed:  Electrical Stimulation:         mins  34612 ( );  Dry Needling          mins 79281/20560  Traction          mins 74181  Canalith Repos                   mins  29692  Low Eval          Mins  54466  Mod Eval          Mins  15631  High Eval                            Mins  49355  Re-Eval                               mins  29809    Timed Treatment:   40   mins   Total Treatment:     40   mins    Pretty Rodriguez PT, CLT, Cert DN  Physical Therapist  [place] Lic # [id number]

## 2023-03-23 ENCOUNTER — TELEPHONE (OUTPATIENT)
Dept: PHYSICAL THERAPY | Facility: CLINIC | Age: 68
End: 2023-03-23
